# Patient Record
Sex: FEMALE | Race: WHITE
[De-identification: names, ages, dates, MRNs, and addresses within clinical notes are randomized per-mention and may not be internally consistent; named-entity substitution may affect disease eponyms.]

---

## 2018-06-16 ENCOUNTER — HOSPITAL ENCOUNTER (EMERGENCY)
Dept: HOSPITAL 17 - NEPE | Age: 48
Discharge: HOME | End: 2018-06-16
Payer: COMMERCIAL

## 2018-06-16 VITALS — BODY MASS INDEX: 28.98 KG/M2 | WEIGHT: 169.76 LBS | HEIGHT: 64 IN

## 2018-06-16 VITALS
RESPIRATION RATE: 18 BRPM | TEMPERATURE: 98.4 F | HEART RATE: 72 BPM | DIASTOLIC BLOOD PRESSURE: 78 MMHG | OXYGEN SATURATION: 97 % | SYSTOLIC BLOOD PRESSURE: 139 MMHG

## 2018-06-16 DIAGNOSIS — Z88.1: ICD-10-CM

## 2018-06-16 DIAGNOSIS — J45.909: ICD-10-CM

## 2018-06-16 DIAGNOSIS — N23: Primary | ICD-10-CM

## 2018-06-16 DIAGNOSIS — K76.0: ICD-10-CM

## 2018-06-16 DIAGNOSIS — Z72.0: ICD-10-CM

## 2018-06-16 DIAGNOSIS — M16.12: ICD-10-CM

## 2018-06-16 DIAGNOSIS — N13.30: ICD-10-CM

## 2018-06-16 DIAGNOSIS — Z88.8: ICD-10-CM

## 2018-06-16 LAB
ALBUMIN SERPL-MCNC: 3.7 GM/DL (ref 3.4–5)
ALP SERPL-CCNC: 68 U/L (ref 45–117)
ALT SERPL-CCNC: 45 U/L (ref 10–53)
AST SERPL-CCNC: 15 U/L (ref 15–37)
BACTERIA #/AREA URNS HPF: (no result) /HPF
BASOPHILS # BLD AUTO: 0 TH/MM3 (ref 0–0.2)
BASOPHILS NFR BLD: 0.5 % (ref 0–2)
BILIRUB SERPL-MCNC: 0.3 MG/DL (ref 0.2–1)
BUN SERPL-MCNC: 13 MG/DL (ref 7–18)
CALCIUM SERPL-MCNC: 8.7 MG/DL (ref 8.5–10.1)
CHLORIDE SERPL-SCNC: 109 MEQ/L (ref 98–107)
COLOR UR: YELLOW
CREAT SERPL-MCNC: 0.58 MG/DL (ref 0.5–1)
EOSINOPHIL # BLD: 0.2 TH/MM3 (ref 0–0.4)
EOSINOPHIL NFR BLD: 2.7 % (ref 0–4)
ERYTHROCYTE [DISTWIDTH] IN BLOOD BY AUTOMATED COUNT: 13.5 % (ref 11.6–17.2)
GFR SERPLBLD BASED ON 1.73 SQ M-ARVRAT: 110 ML/MIN (ref 89–?)
GLUCOSE SERPL-MCNC: 85 MG/DL (ref 74–106)
GLUCOSE UR STRIP-MCNC: (no result) MG/DL
HCO3 BLD-SCNC: 24.3 MEQ/L (ref 21–32)
HCT VFR BLD CALC: 39.7 % (ref 35–46)
HGB BLD-MCNC: 13.4 GM/DL (ref 11.6–15.3)
HGB UR QL STRIP: (no result)
KETONES UR STRIP-MCNC: (no result) MG/DL
LYMPHOCYTES # BLD AUTO: 2.4 TH/MM3 (ref 1–4.8)
LYMPHOCYTES NFR BLD AUTO: 27.7 % (ref 9–44)
MCH RBC QN AUTO: 31.7 PG (ref 27–34)
MCHC RBC AUTO-ENTMCNC: 33.8 % (ref 32–36)
MCV RBC AUTO: 93.9 FL (ref 80–100)
MONOCYTE #: 0.7 TH/MM3 (ref 0–0.9)
MONOCYTES NFR BLD: 8.1 % (ref 0–8)
MUCOUS THREADS #/AREA URNS LPF: (no result) /LPF
NEUTROPHILS # BLD AUTO: 5.3 TH/MM3 (ref 1.8–7.7)
NEUTROPHILS NFR BLD AUTO: 61 % (ref 16–70)
NITRITE UR QL STRIP: (no result)
PLATELET # BLD: 261 TH/MM3 (ref 150–450)
PMV BLD AUTO: 6.8 FL (ref 7–11)
PROT SERPL-MCNC: 7.1 GM/DL (ref 6.4–8.2)
RBC # BLD AUTO: 4.22 MIL/MM3 (ref 4–5.3)
SODIUM SERPL-SCNC: 141 MEQ/L (ref 136–145)
SP GR UR STRIP: 1.02 (ref 1–1.03)
SQUAMOUS #/AREA URNS HPF: 3 /HPF (ref 0–5)
URINE LEUKOCYTE ESTERASE: (no result)
WBC # BLD AUTO: 8.7 TH/MM3 (ref 4–11)

## 2018-06-16 PROCEDURE — 85025 COMPLETE CBC W/AUTO DIFF WBC: CPT

## 2018-06-16 PROCEDURE — 74176 CT ABD & PELVIS W/O CONTRAST: CPT

## 2018-06-16 PROCEDURE — 87086 URINE CULTURE/COLONY COUNT: CPT

## 2018-06-16 PROCEDURE — 81001 URINALYSIS AUTO W/SCOPE: CPT

## 2018-06-16 PROCEDURE — 96374 THER/PROPH/DIAG INJ IV PUSH: CPT

## 2018-06-16 PROCEDURE — 80053 COMPREHEN METABOLIC PANEL: CPT

## 2018-06-16 PROCEDURE — 99284 EMERGENCY DEPT VISIT MOD MDM: CPT

## 2018-06-16 PROCEDURE — 84703 CHORIONIC GONADOTROPIN ASSAY: CPT

## 2018-06-16 NOTE — PD
HPI


Chief Complaint:  Flank/Kidney Pain


Time Seen by Provider:  13:45


Travel History


International Travel<30 days:  No


Contact w/Intl Traveler<30days:  No


Traveled to known affect area:  No





History of Present Illness


HPI


48-year-old female patient with history of previous history of cholecystectomy, 

ovarian cyst, presents to the ER today because she started having right flank 

pain starting last night, and is radiating towards the front, currently a 

constant 7 out of 10.  She does not know of any exacerbating alleviating 

factors.  She started urinating blood today.  She denies any nausea, vomiting, 

diarrhea, or other symptoms.





Modifying Factors: None


Associated Signs & Symptoms: Right flank pain, radiating to the front, blood in 

the urine


Risk Factors: None





PFSH


Past Medical History


Arthritis:  Yes (L HIP ARTHRITIS)


Cancer:  No


Cardiovascular Problems:  Yes (MURMUR)


Diabetes:  No


Endocrine:  No


Genitourinary:  No


Hepatitis:  No


Hiatal Hernia:  No


Immune Disorder:  No


Musculoskeletal:  No


Neurologic:  No


Psychiatric:  No


Reproductive:  No


Respiratory:  Yes (ASTHMA)


Immunizations Current:  Yes


Thyroid Disease:  No


Pregnant?:  Not Pregnant


:  4


Para:  4


Miscarriage:  0


:  0





Past Surgical History


Abdominal Surgery:  Yes


AICD:  No


Body Medical Devices:  NONE


Cardiac Surgery:  No


Cholecystectomy:  Yes ()


Ear Surgery:  No


Endocrine Surgery:  No


Eye Surgery:  No


Genitourinary Surgery:  No


Gynecologic Surgery:  Yes (OVARIAN CYST,)


Joint Replacement:  Yes (b hip)


Oral Surgery:  Yes (TEETH PULLED)


Pacemaker:  No


Thoracic Surgery:  No


Other Surgery:  Yes (RUPTURED CYST )





Social History


Alcohol Use:  Yes (OCCASSIONAL)


Tobacco Use:  Yes (occas)


Substance Use:  No





Allergies-Medications


(Allergen,Severity, Reaction):  


Coded Allergies:  


     ciprofloxacin (Unverified  Allergy, Severe, Headache, HIVES, 18)


     diatrizoate meglumine (Unverified  Allergy, Severe, Hives, 18)


     gadobenic acid (Unverified  Allergy, Severe, Hives, 18)


     gadodiamide (Unverified  Allergy, Severe, Hives, 18)


     gadoteridol (Unverified  Allergy, Severe, Hives, 18)


     iodixanol (Unverified  Allergy, Severe, Hives, 18)


     iohexol (Unverified  Allergy, Severe, Hives, 18)


     metronidazole (Unverified  Allergy, Severe, Hives, 18)


     Iodinated Contrast- Oral and IV Dye (Verified  Allergy, Intermediate, hives

, 18)


Uncoded Allergies:  


     BORDER GAUZE (Adverse Reaction, Severe, RASH, 16)


Reported Meds & Prescriptions





Reported Meds & Active Scripts


Active


No Active Prescriptions or Reported Medications    








Review of Systems


Except as stated in HPI:  all other systems reviewed are Neg





Physical Exam


Narrative


GENERAL: Well-developed middle-age female patient currently in moderate 

distress.  Awake and oriented 3.


SKIN: Focused skin assessment warm/dry.


HEAD: Atraumatic. Normocephalic. 


EYES: Pupils equal and round. No scleral icterus. No injection or drainage. 


ENT: No nasal bleeding or discharge.  Mucous membranes pink and moist.


NECK: Trachea midline. No JVD. 


CARDIOVASCULAR: Regular rate and rhythm.  No murmur appreciated.


RESPIRATORY: No accessory muscle use. Clear to auscultation. Breath sounds 

equal bilaterally. 


GASTROINTESTINAL: Abdomen soft, non-tender, nondistended. Hepatic and splenic 

margins not palpable. 


MUSCULOSKELETAL: No obvious deformities. No clubbing.  No cyanosis.  No edema.  


BACK: Mild right CVA tenderness. No rash.  No point tenderness on palpation of 

the spine.


NEUROLOGICAL: Awake and alert. No obvious cranial nerve deficits.  Motor 

grossly within normal limits. Normal speech.


PSYCHIATRIC: Appropriate mood and affect; insight and judgment normal.





Data


Data


Last Documented VS





Vital Signs








  Date Time  Temp Pulse Resp B/P (MAP) Pulse Ox O2 Delivery O2 Flow Rate FiO2


 


18:18 98.4 72 18 139/78 (98) 97   








Orders





 Orders


Complete Blood Count With Diff (18 13:32)


Comprehensive Metabolic Panel (18 13:32)


Urinalysis - C+S If Indicated (18 13:32)


Iv Access Insert/Monitor (18 13:32)


Ecg Monitoring (18 13:32)


Oximetry (18 13:32)


Sodium Chloride 0.9% Flush (Ns Flush) (18 13:45)


Ketorolac Inj (Toradol Inj) (18 13:45)


Ct Abd/Pel W/O Iv Contrast (18 13:45)


Ed Urine Pregnancytest Poc (18 13:45)


Urine Culture (18 13:45)


Ed Discharge Order (18 16:33)





Labs





Laboratory Tests








Test


  18


13:45


 


White Blood Count 8.7 TH/MM3 


 


Red Blood Count 4.22 MIL/MM3 


 


Hemoglobin 13.4 GM/DL 


 


Hematocrit 39.7 % 


 


Mean Corpuscular Volume 93.9 FL 


 


Mean Corpuscular Hemoglobin 31.7 PG 


 


Mean Corpuscular Hemoglobin


Concent 33.8 % 


 


 


Red Cell Distribution Width 13.5 % 


 


Platelet Count 261 TH/MM3 


 


Mean Platelet Volume 6.8 FL 


 


Neutrophils (%) (Auto) 61.0 % 


 


Lymphocytes (%) (Auto) 27.7 % 


 


Monocytes (%) (Auto) 8.1 % 


 


Eosinophils (%) (Auto) 2.7 % 


 


Basophils (%) (Auto) 0.5 % 


 


Neutrophils # (Auto) 5.3 TH/MM3 


 


Lymphocytes # (Auto) 2.4 TH/MM3 


 


Monocytes # (Auto) 0.7 TH/MM3 


 


Eosinophils # (Auto) 0.2 TH/MM3 


 


Basophils # (Auto) 0.0 TH/MM3 


 


CBC Comment DIFF FINAL 


 


Differential Comment  


 


Urine Color YELLOW 


 


Urine Turbidity HAZY 


 


Urine pH 5.0 


 


Urine Specific Gravity 1.017 


 


Urine Protein 100 mg/dL 


 


Urine Glucose (UA) NEG mg/dL 


 


Urine Ketones NEG mg/dL 


 


Urine Occult Blood LARGE 


 


Urine Nitrite NEG 


 


Urine Bilirubin NEG 


 


Urine Urobilinogen


  LESS THAN 2


mg/dL


 


Urine Leukocyte Esterase NEG 


 


Urine RBC  /hpf 


 


Urine WBC 17 /hpf 


 


Urine Squamous Epithelial


Cells 3 /hpf 


 


 


Urine Bacteria OCC /hpf 


 


Urine Mucus MOD /lpf 


 


Urine Yeast (Budding) MANY 


 


Microscopic Urinalysis Comment


  CULTURE


INDICATED


 


Blood Urea Nitrogen 13 MG/DL 


 


Creatinine 0.58 MG/DL 


 


Random Glucose 85 MG/DL 


 


Total Protein 7.1 GM/DL 


 


Albumin 3.7 GM/DL 


 


Calcium Level 8.7 MG/DL 


 


Alkaline Phosphatase 68 U/L 


 


Aspartate Amino Transf


(AST/SGOT) 15 U/L 


 


 


Alanine Aminotransferase


(ALT/SGPT) 45 U/L 


 


 


Total Bilirubin 0.3 MG/DL 


 


Sodium Level 141 MEQ/L 


 


Potassium Level 3.8 MEQ/L 


 


Chloride Level 109 MEQ/L 


 


Carbon Dioxide Level 24.3 MEQ/L 


 


Anion Gap 8 MEQ/L 


 


Estimat Glomerular Filtration


Rate 110 ML/MIN 


 











MDM


Medical Decision Making


Medical Screen Exam Complete:  Yes


Emergency Medical Condition:  Yes


Medical Record Reviewed:  Yes


Interpretation(s)





Laboratory Tests








Test


  18


13:45


 


Mean Platelet Volume


  6.8 FL


(7.0-11.0)


 


Monocytes (%) (Auto)


  8.1 %


(0.0-8.0)


 


Urine Turbidity HAZY (CLEAR) 


 


Urine Protein


  100 mg/dL


(NEG-TRACE)


 


Urine Occult Blood LARGE (NEG) 


 


Urine WBC 17 /hpf (0-5) 


 


Urine Bacteria


  OCC /hpf


(NONE)


 


Urine Mucus MOD /lpf (OCC) 


 


Urine Yeast (Budding) MANY (NONE) 


 


Chloride Level


  109 MEQ/L


()








Last 24 hours Impressions








Abdomen/Pelvis CT 18 1345 Signed





Impressions: 





 CONCLUSION:





 1.  There is a 7 mm x 5 mm calculus at the right UPJ with right-sided obstructi





 ve uropathy and mild hydronephrosis





 2.  Diffuse fatty liver enlarged to at least 22 cm.





  





 








Differential Diagnosis


Right flank pain, hematuria: UTI/pyelonephritis versus renal colic versus 

dehydration versus hepatic disease


Narrative Course


CAT scan shows a 7 mm x 5 mm stone at the right UPJ, causing hydronephrosis, 

and the case was discussed with Dr. Harris of urology who states that the 

patient can follow-up in his clinic on Wednesday for lithotripsy.  Call for 

appointment on Monday.  At this point, she does have some white blood cells in 

her urine but no signs of significant infection on lab work and I have 

discussed this with urology as well and he states that she does not need 

antibiotics at this time.  She needs pain control however.  Return for any 

worsening in pain or new symptoms as needed.  The plan has been discussed with 

her and she states understanding.





Diagnosis





 Primary Impression:  


 Renal colic on right side


Referrals:  


Jefry Dumont MD


2 days


***Med/Other Pt SpecificInfo:  Prescription(s) given


Scripts


Oxycodone-Acetaminophen (Percocet) 5-325 mg Tab


1-2 TAB PO Q6H Y for PAIN, #12 TAB 0 Refills


   Prov: Flako Araujo MD         18 


Ibuprofen (Ibuprofen) 600 Mg Tab


600 MG PO Q6H Y for Pain/Inflammation, #20 TAB 0 Refills


   Prov: Flako Araujo MD         18


Disposition:  01 DISCHARGE HOME


Condition:  Stable











Flako Araujo MD 2018 13:49

## 2018-06-20 ENCOUNTER — HOSPITAL ENCOUNTER (OUTPATIENT)
Dept: HOSPITAL 17 - HSDC | Age: 48
Discharge: HOME | End: 2018-06-20
Payer: COMMERCIAL

## 2018-06-20 VITALS
HEART RATE: 77 BPM | DIASTOLIC BLOOD PRESSURE: 83 MMHG | RESPIRATION RATE: 20 BRPM | OXYGEN SATURATION: 96 % | SYSTOLIC BLOOD PRESSURE: 140 MMHG | TEMPERATURE: 97.7 F

## 2018-06-20 VITALS — WEIGHT: 177.69 LBS | BODY MASS INDEX: 30.34 KG/M2 | HEIGHT: 64 IN

## 2018-06-20 DIAGNOSIS — N20.2: Primary | ICD-10-CM

## 2018-06-20 DIAGNOSIS — Z96.643: ICD-10-CM

## 2018-06-20 DIAGNOSIS — Z01.810: ICD-10-CM

## 2018-06-20 DIAGNOSIS — Z01.818: ICD-10-CM

## 2018-06-20 DIAGNOSIS — M16.0: ICD-10-CM

## 2018-06-20 LAB
BASOPHILS # BLD AUTO: 0 TH/MM3 (ref 0–0.2)
BASOPHILS NFR BLD: 0.5 % (ref 0–2)
EOSINOPHIL # BLD: 0.2 TH/MM3 (ref 0–0.4)
EOSINOPHIL NFR BLD: 3.3 % (ref 0–4)
ERYTHROCYTE [DISTWIDTH] IN BLOOD BY AUTOMATED COUNT: 13.4 % (ref 11.6–17.2)
HCT VFR BLD CALC: 39.5 % (ref 35–46)
HGB BLD-MCNC: 13.4 GM/DL (ref 11.6–15.3)
LYMPHOCYTES # BLD AUTO: 2.1 TH/MM3 (ref 1–4.8)
LYMPHOCYTES NFR BLD AUTO: 30.3 % (ref 9–44)
MCH RBC QN AUTO: 31.9 PG (ref 27–34)
MCHC RBC AUTO-ENTMCNC: 34 % (ref 32–36)
MCV RBC AUTO: 93.9 FL (ref 80–100)
MONOCYTE #: 0.5 TH/MM3 (ref 0–0.9)
MONOCYTES NFR BLD: 7.5 % (ref 0–8)
NEUTROPHILS # BLD AUTO: 4 TH/MM3 (ref 1.8–7.7)
NEUTROPHILS NFR BLD AUTO: 58.4 % (ref 16–70)
PLATELET # BLD: 255 TH/MM3 (ref 150–450)
PMV BLD AUTO: 7 FL (ref 7–11)
RBC # BLD AUTO: 4.21 MIL/MM3 (ref 4–5.3)
WBC # BLD AUTO: 6.9 TH/MM3 (ref 4–11)

## 2018-06-20 PROCEDURE — C2617 STENT, NON-COR, TEM W/O DEL: HCPCS

## 2018-06-20 PROCEDURE — 74018 RADEX ABDOMEN 1 VIEW: CPT

## 2018-06-20 PROCEDURE — 93005 ELECTROCARDIOGRAM TRACING: CPT

## 2018-06-20 PROCEDURE — 00910 ANES TRANSURETHRAL PX NOS: CPT

## 2018-06-20 PROCEDURE — 50590 FRAGMENTING OF KIDNEY STONE: CPT

## 2018-06-20 PROCEDURE — C1769 GUIDE WIRE: HCPCS

## 2018-06-20 PROCEDURE — 85025 COMPLETE CBC W/AUTO DIFF WBC: CPT

## 2018-06-20 PROCEDURE — 74420 UROGRAPHY RTRGR +-KUB: CPT

## 2018-06-20 PROCEDURE — 52332 CYSTOSCOPY AND TREATMENT: CPT

## 2018-06-20 NOTE — PD.OP
__________________________________________________





Operative Report


Date of Surgery:  Jun 20, 2018


Preoperative Diagnosis:  


Right UPJ stone


Postoperative Diagnosis:  


Same


Procedure:


Cystoscopy with right retrograde pyelogram and right double-J stent insertion 

followed by right extracorporeal shockwave lithotripsy


Anesthesia:


General LMA


Surgeon:


Kevin Metz


Assistant(s):


None


Resident Surgeon:


None


Operation and Findings:


48-year-old female with findings of an 8 mm right UPJ stone who elected to 

undergo cystoscopy with right double-J stent insertion followed by right 

extracorporeal shockwave lithotripsy.  Risk and benefits were discussed 

preoperatively and the patient was willing to proceed.  The patient was brought 

to the operating room and identified by myself as Jeaneth Ortiz.  She was 

placed in dorsal lithotomy position, prepped and draped in usual sterile fashion

, received preprocedure antibiotics and general LMA anesthesia was 

administered.  22 Upper sorbian cystoscope was inserted the bladder pan cystoscopy did 

not reveal any abnormalities.  The right ureteral orifice was identified and a 

5 Upper sorbian opening catheter was inserted into the right ureteral orifice without 

difficulty.  A retrograde pyelogram demonstrated a stone at the area of the 

right UPJ.  A 0.35 sensor wire was then passed through the open-ended catheter 

and the open-ended catheter was then removed.  A 6 Upper sorbian 22 cm right double-J 

stent was placed with a good curl in the kidney and the bladder.  The bladder 

was evacuated and the patient was then placed into the supine position.  Under 

fluoroscopic imaging guidance the stone was located within the right collecting 

system.  ESWL therapy commenced with the patient receiving 2500 shocks with 

good fragmentation of the stone visualized on fluoroscopy.  The patient 

tolerated the procedure well and was awoken and extubated and transferred 

recovery room in stable condition.  She will follow-up in a few weeks to the 

office to undergo cystoscopy with right double-J stent removal and to be 

scheduled for left extracorporeal shockwave lithotripsy for her 1.5 cm left 

upper pole stone.











Kevin Metz DO Jun 20, 2018 17:27

## 2018-06-20 NOTE — RADRPT
EXAM DATE:  6/20/2018 2:01 PM EDT

AGE/SEX:        48 years / Female



INDICATIONS:  Pre op lithotripsy.



CLINICAL DATA:  This is the patient's initial encounter. Patient reports that signs and symptoms have
 been present for 1 day and indicates a pain score of 5/10. 

                                                                          

MEDICAL/SURGICAL HISTORY:       None. None.



COMPARISON:      [facility] – [place], CT ABDOMEN & PELVIS W/O CONTRAST, 6/16/2018.  . 





FINDINGS: 

 The abdominal bowel gas pattern is normal.  Calcifications are seen in the left upper quadrant proje
cting over the superior aspect of the left kidney. There is a 0.6 and are calcifications seen to the 
right at the L4 vertebral body potential in the right proximal ureter. Clips are seen in the right up
per quadrant. There is a single clip in the right pelvis. Bilateral hip prostheses are present. 



CONCLUSION:

6 mm calcification seen over the proximal right ureter region.

Left renal stones projecting over the superior left collecting system.





Electronically signed by: Abdiel Walker MD  6/20/2018 2:12 PM EDT

## 2018-06-20 NOTE — EKG
Date Performed: 06/20/2018       Time Performed: 13:00:01

 

PTAGE:      48 years

 

EKG:      Sinus rhythm 

 

 POSSIBLE LEFT ATRIAL ENLARGEMENT INCOMPLETE RIGHT BUNDLE BRANCH BLOCK BORDERLINE ECG

 

PREVIOUS TRACING       : 06/15/2016 06.30

 

DOCTOR:   Wilbert Solares  Interpretating Date/Time  06/20/2018 13:22:07

## 2018-07-08 NOTE — RADRPT
EXAM DATE:  6/16/2018 3:35 PM EDT

AGE/SEX:        48 years / Female



INDICATIONS:  Right flank pain, gross hematuria.



CLINICAL DATA:  This is the patient's initial encounter. Patient reports that signs and symptoms have
 been present for 1 day and indicates a pain score of 6/10. 

                                                                          

MEDICAL/SURGICAL HISTORY:       Hypertension. Cholecystectomy.



RADIATION DOSE:  8.42 CTDI (mGy)









COMPARISON:      [facility] – [place], [place] ABDOMEN & PELVIS W/O CONTRAST, 12/3/2012.  . 





TECHNIQUE:  Multiple contiguous axial images were obtained through the abdomen. Images were obtained 
using multiple row detector helical technique. Using dose reduction techniques, radiation dose was ke
pt as low as reasonably achievable to obtain optimal diagnostic quality images. 



FINDINGS: 

There is a 7 mm x 5 mm calculus at the right ureteropelvic junction with right-sided obstructive urop
athy and mild hydronephrosis. There is a cluster of calculi in the upper pole left kidney measuring u
p to 15 mm in diameter that are nonobstructing.



Fatty liver. Spleen, adrenals and pancreas are unremarkable.



Previous bilateral hip replacement. No free fluid or free air. No bowel obstruction.



CONCLUSION:

1.  There is a 7 mm x 5 mm calculus at the right UPJ with right-sided obstructive uropathy and mild h
ydronephrosis

2.  Diffuse fatty liver enlarged to at least 22 cm.



Electronically signed by: Tony Ross MD  6/16/2018 4:09 PM EDT
No